# Patient Record
Sex: FEMALE | Race: WHITE
[De-identification: names, ages, dates, MRNs, and addresses within clinical notes are randomized per-mention and may not be internally consistent; named-entity substitution may affect disease eponyms.]

---

## 2017-07-12 ENCOUNTER — HOSPITAL ENCOUNTER (INPATIENT)
Dept: HOSPITAL 10 - OBT | Age: 37
LOS: 2 days | Discharge: HOME | End: 2017-07-14
Attending: OBSTETRICS & GYNECOLOGY | Admitting: OBSTETRICS & GYNECOLOGY
Payer: MEDICAID

## 2017-07-12 VITALS
WEIGHT: 154.98 LBS | HEIGHT: 62 IN | WEIGHT: 154.98 LBS | BODY MASS INDEX: 28.52 KG/M2 | HEIGHT: 62 IN | BODY MASS INDEX: 28.52 KG/M2

## 2017-07-12 VITALS — SYSTOLIC BLOOD PRESSURE: 105 MMHG | DIASTOLIC BLOOD PRESSURE: 57 MMHG | RESPIRATION RATE: 18 BRPM | HEART RATE: 95 BPM

## 2017-07-12 DIAGNOSIS — Z3A.37: ICD-10-CM

## 2017-07-12 DIAGNOSIS — Z23: ICD-10-CM

## 2017-07-12 LAB
ADD SCAN DIFF: NO
APTT BLD: 25.9 SEC (ref 25–35)
BASOPHILS # BLD AUTO: 0 10^3/UL (ref 0–0.1)
BASOPHILS NFR BLD: 0.5 % (ref 0–2)
EOSINOPHIL # BLD: 0 10^3/UL (ref 0–0.5)
EOSINOPHIL NFR BLD: 0.2 % (ref 0–7)
ERYTHROCYTE [DISTWIDTH] IN BLOOD BY AUTOMATED COUNT: 12.4 % (ref 11.5–14.5)
HCT VFR BLD CALC: 28.7 % (ref 37–47)
HGB BLD-MCNC: 9.8 G/DL (ref 12–16)
INR PPP: 0.95
LYMPHOCYTES # BLD AUTO: 1.7 10^3/UL (ref 0.8–2.9)
LYMPHOCYTES NFR BLD AUTO: 20.9 % (ref 15–51)
MCH RBC QN AUTO: 31 PG (ref 29–33)
MCHC RBC AUTO-ENTMCNC: 34.1 G/DL (ref 32–37)
MCV RBC AUTO: 90.8 FL (ref 82–101)
MONOCYTES # BLD: 0.3 10^3/UL (ref 0.3–0.9)
MONOCYTES NFR BLD: 4.1 % (ref 0–11)
NEUTROPHILS # BLD: 5.9 10^3/UL (ref 1.6–7.5)
NEUTROPHILS NFR BLD AUTO: 73.7 % (ref 39–77)
NRBC # BLD MANUAL: 0 10^3/UL (ref 0–0)
NRBC BLD QL: 0 /100WBC (ref 0–0)
PLATELET # BLD: 282 10^3/UL (ref 140–415)
PMV BLD AUTO: 10.7 FL (ref 7.4–10.4)
PROTHROMBIN TIME: 12.7 SEC (ref 12.2–14.2)
PT RATIO: 1
RBC # BLD AUTO: 3.16 10^6/UL (ref 4.2–5.4)
WBC # BLD AUTO: 8.1 10^3/UL (ref 4.8–10.8)

## 2017-07-12 PROCEDURE — 87340 HEPATITIS B SURFACE AG IA: CPT

## 2017-07-12 PROCEDURE — 62319: CPT

## 2017-07-12 PROCEDURE — 85014 HEMATOCRIT: CPT

## 2017-07-12 PROCEDURE — 86900 BLOOD TYPING SEROLOGIC ABO: CPT

## 2017-07-12 PROCEDURE — 90716 VAR VACCINE LIVE SUBQ: CPT

## 2017-07-12 PROCEDURE — 86901 BLOOD TYPING SEROLOGIC RH(D): CPT

## 2017-07-12 PROCEDURE — 90715 TDAP VACCINE 7 YRS/> IM: CPT

## 2017-07-12 PROCEDURE — 85730 THROMBOPLASTIN TIME PARTIAL: CPT

## 2017-07-12 PROCEDURE — 86592 SYPHILIS TEST NON-TREP QUAL: CPT

## 2017-07-12 PROCEDURE — 85610 PROTHROMBIN TIME: CPT

## 2017-07-12 PROCEDURE — 85025 COMPLETE CBC W/AUTO DIFF WBC: CPT

## 2017-07-12 PROCEDURE — G0463 HOSPITAL OUTPT CLINIC VISIT: HCPCS

## 2017-07-12 PROCEDURE — 3E033VJ INTRODUCTION OF OTHER HORMONE INTO PERIPHERAL VEIN, PERCUTANEOUS APPROACH: ICD-10-PCS | Performed by: OBSTETRICS & GYNECOLOGY

## 2017-07-12 PROCEDURE — 85018 HEMOGLOBIN: CPT

## 2017-07-12 RX ADMIN — PYRIDOXINE HYDROCHLORIDE SCH MLS/HR: 100 INJECTION, SOLUTION INTRAMUSCULAR; INTRAVENOUS at 13:59

## 2017-07-12 RX ADMIN — PYRIDOXINE HYDROCHLORIDE SCH MLS/HR: 100 INJECTION, SOLUTION INTRAMUSCULAR; INTRAVENOUS at 17:17

## 2017-07-12 NOTE — HP
Date/Time of Note


Date/Time of Note


DATE: 17 


TIME: 22:38





OB - History


Hx of Present Pregnancy


Free Text/Dictation


36-year-old   with IUP at 37 weeks and 6 days  and prenatal care at [facility] , presented in labor. She was 4/80/-3. GBS was unknown. Admited in labor 

and started on Abx, GBS prophylaxis.


KATHERINE: 2017 


FHT: Cat 1. contractions every 2-3/min


Estimated Due Date:  2017


:  7


Para:  6


Spontaneous :  0


Prenatal Care:  Good Care


Obstetrical Complications:  None





Past Family/Social History


*


Past Medical, Surgical, Family and Obstetric Histories reviewed from prenatal 

chart.


Rubella:  immune


RPR/VDRL:  Negative


GBS Status:  Unknown


HBsAG:  Negative





OB  Admission Exam


Vital Signs


Vital Signs





 Vital Signs








  Date Time  Temp Pulse Resp B/P Pulse Ox O2 Delivery O2 Flow Rate FiO2


 


17 12:16 98.1 95 18 105/57  Room Air  











Physical Exam


HEENT:  WNL


Lungs:  Clear


Abdomen:  WNL


Extremities:  Normal


Reflexes:  Normal


Cervical Dilatation:  4cm


Effacement:  75%


Station:  -3


Membranes:  Intact


Fetal Heart Rate:  130's


Accelerations:  Accelerations Present


Decelerations:  Early Decelerations


Varibility:  Moderate


Contractions on Admission:  < 5 Minutes Apart


Intensity:  Moderate


Last 72 hours Lab Results


 CBC & BMP


17 12:35











OB  Assessment/Plan


Reason for admission:  active labor


Other Assessment:


IUP at 37 weeks and 6 days


Active labor


GBs unknown


Started on GBS prophylaxis


Anticipate 


Desires epidural for the labor pain











SHANNAN GROSS MD 2017 22:44

## 2017-07-12 NOTE — TRIAGE
===================================

OB Triage

===================================

Datetime Report Generated by CPN: 07/12/2017 12:24

   

   

===========================

Datetime: 07/12/2017 12:23

===========================

   

 Assessment Type:  Admission Assessment

   

===================================

Maternal Assessment

===================================

   

 Level of Consciousness:  Fully Conscious

 DTR's/Clonus:  DTRs 2+; No Clonus

 Headache:  Denies

 Blurred Vision:  No

 Respiratory Effort:  Unlabored; Regular Rhythm; Equal Expansion

 Breath Sounds, Left:  Clear and Equal

 Breath Sounds, Right:  Clear and Equal

 Nausea/Vomiting:  Denies

 RUQ Epigastric Pain:  Denies

 Lower Extremities Edema:  None

     Degree:  None

 Upper Extremities Edema:  None

     Degree:  None

 Facial Edema:  None

   

===================================

Fall Risk Assessment

===================================

   

 History of Falling:  (0) No

 Secondary Diagnosis:  (0) No

 Ambulatory Aid:  (0) Bedrest/Nurse Assist

 IV Therapy:  (0) No

 Gait:  (0) Normal/Bedrest/Immobile

 Mental Status:  (0) Oriented to Own Ability

 Fall Score:  0

 Fall Risk Score Definition:  No Risk: No action required

   

===========================

Datetime: 07/12/2017 12:22

===========================

   

 EGA:  37.6

   

===========================

Datetime: 07/12/2017 12:21

===========================

   

 Time of Arrival:  07/12/2017 11:30

 Arrived By:  Ambulatory

 Arrived From:  Home

 Chief Complaint:  UC'S

 Fetal Movement:  Present

 Contractions:  Regular

 Time Contractions Began:  07/11/2017 22:00

 Rupture of Membranes:  Denies

 Vaginal Bleeding:  None

 Vaginal Discharge:  Denies

 Recent Sexual Intercouse:  Denies

 Abdominal Trauma:  Not Applicable

 Patient Complaints:  Contractions; Cramping

 Time Provider Notified:  07/12/2017 12:10

 Provider Notified:  DR GROSS

 Initial Plan:  LOIS MENCHACA

## 2017-07-12 NOTE — LDN
Date/Time of Note


Date/Time of Note


DATE: 7/12/17 


TIME: 22:44





Delivery Summary


07/12/2017


Weeks of Gestation


37 weeks and 6 days


Placenta Delivered:  Spontaneously


Meconium:  none


Episiotomy:  No


Perineal laceration:  0


Laceration repair:  


supraurethral


lacerations that


repaired with 3-0


Vicryl. cathater placed


and was noted to be


intact.


Anesthesia type:  Epidural


Estimated blood loss:  100


Sponge & Needle done & correct:  Yes


All needle counts correct:  Yes


Any foreign bodies felt in the:  No


Problems:  





Infant Delivery Information


Sex


Infant Sex:  female





Apgars


1 Minute:  9


5 Minute:  9





Suctioning


Nose & mouth suctioned at dimple:  Yes


Delee suction performed:  Yes





Umbilical Cord


Umbilical cord with:  3 Vessels


Cord presentations:  nuchal cord


Nuchal cord present X:  1


Cord Blood was obtained:  Yes











SHANNAN GROSS MD Jul 12, 2017 22:47

## 2017-07-13 VITALS — RESPIRATION RATE: 18 BRPM | DIASTOLIC BLOOD PRESSURE: 59 MMHG | SYSTOLIC BLOOD PRESSURE: 105 MMHG | HEART RATE: 66 BPM

## 2017-07-13 VITALS — SYSTOLIC BLOOD PRESSURE: 89 MMHG | DIASTOLIC BLOOD PRESSURE: 54 MMHG | HEART RATE: 68 BPM | RESPIRATION RATE: 18 BRPM

## 2017-07-13 VITALS — RESPIRATION RATE: 17 BRPM | DIASTOLIC BLOOD PRESSURE: 69 MMHG | HEART RATE: 60 BPM | SYSTOLIC BLOOD PRESSURE: 107 MMHG

## 2017-07-13 VITALS — RESPIRATION RATE: 18 BRPM | HEART RATE: 69 BPM | DIASTOLIC BLOOD PRESSURE: 55 MMHG | SYSTOLIC BLOOD PRESSURE: 93 MMHG

## 2017-07-13 VITALS — RESPIRATION RATE: 18 BRPM | SYSTOLIC BLOOD PRESSURE: 89 MMHG | DIASTOLIC BLOOD PRESSURE: 60 MMHG | HEART RATE: 77 BPM

## 2017-07-13 LAB
HCT VFR BLD CALC: 28.4 % (ref 37–47)
HGB BLD-MCNC: 9.4 G/DL (ref 12–16)

## 2017-07-13 RX ADMIN — IBUPROFEN SCH MG: 600 TABLET ORAL at 13:10

## 2017-07-13 RX ADMIN — DOCUSATE SODIUM AND SENNOSIDES SCH TAB: 8.6; 5 TABLET, FILM COATED ORAL at 01:00

## 2017-07-13 RX ADMIN — ACETAMINOPHEN AND CODEINE PHOSPHATE PRN TAB: 30; 300 TABLET ORAL at 18:18

## 2017-07-13 RX ADMIN — ACETAMINOPHEN AND CODEINE PHOSPHATE PRN TAB: 30; 300 TABLET ORAL at 14:08

## 2017-07-13 RX ADMIN — PYRIDOXINE HYDROCHLORIDE SCH MLS/HR: 100 INJECTION, SOLUTION INTRAMUSCULAR; INTRAVENOUS at 00:52

## 2017-07-13 RX ADMIN — IBUPROFEN SCH MG: 600 TABLET ORAL at 01:00

## 2017-07-13 RX ADMIN — IBUPROFEN SCH MG: 600 TABLET ORAL at 18:44

## 2017-07-13 RX ADMIN — IBUPROFEN SCH MG: 600 TABLET ORAL at 23:58

## 2017-07-13 RX ADMIN — ACETAMINOPHEN AND CODEINE PHOSPHATE PRN TAB: 30; 300 TABLET ORAL at 02:19

## 2017-07-13 RX ADMIN — DOCUSATE SODIUM AND SENNOSIDES SCH TAB: 8.6; 5 TABLET, FILM COATED ORAL at 20:48

## 2017-07-13 RX ADMIN — PYRIDOXINE HYDROCHLORIDE SCH MLS/HR: 100 INJECTION, SOLUTION INTRAMUSCULAR; INTRAVENOUS at 08:52

## 2017-07-13 RX ADMIN — ACETAMINOPHEN AND CODEINE PHOSPHATE PRN TAB: 30; 300 TABLET ORAL at 08:23

## 2017-07-13 RX ADMIN — DOCUSATE SODIUM AND SENNOSIDES SCH TAB: 8.6; 5 TABLET, FILM COATED ORAL at 09:15

## 2017-07-13 RX ADMIN — IBUPROFEN SCH MG: 600 TABLET ORAL at 05:31

## 2017-07-13 RX ADMIN — PYRIDOXINE HYDROCHLORIDE SCH MLS/HR: 100 INJECTION, SOLUTION INTRAMUSCULAR; INTRAVENOUS at 16:52

## 2017-07-13 NOTE — PN
Date/Time of Note


Date/Time of Note


DATE: 7/13/17 


TIME: 12:03





OB Subjective


Subjective


Subjective


July 13, 2017


OB Hospital ground


Post partum day 1





Patient is doing well,


Ambulatory 


She is afebrile


Abdomen is soft ,


Fundus is firm


Moderate amount of lochia


Breasts are soft,


Nipples are intact


No calf tenderness.


Perineum is healing well.


Breast feeding the new born.








 Laboratory Tests








Test


  7/12/17


12:35 7/13/17


09:25


 


White Blood Count 8.110^3/ul  


 


Red Blood Count 3.1610^6/ul  


 


Hemoglobin 9.8g/dl  9.4g/dl 


 


Hematocrit 28.7%  28.4% 


 


Mean Corpuscular Volume 90.8fl  


 


Mean Corpuscular Hemoglobin 31.0pg  


 


Mean Corpuscular Hemoglobin


Concent 34.1g/dl 


  


 


 


Red Cell Distribution Width 12.4%  


 


Platelet Count 99812^3/UL  


 


Mean Platelet Volume 10.7fl  


 


Neutrophils % 73.7%  


 


Lymphocytes % 20.9%  


 


Monocytes % 4.1%  


 


Eosinophils % 0.2%  


 


Basophils % 0.5%  


 


Nucleated Red Blood Cells % 0.0/100WBC  


 


Neutrophils # 5.910^3/ul  


 


Lymphocytes # 1.710^3/ul  


 


Monocytes # 0.310^3/ul  


 


Eosinophils # 0.010^3/ul  


 


Basophils # 0.010^3/ul  


 


Nucleated Red Blood Cells # 0.010^3/ul  


 


Prothrombin Time 12.7Sec  


 


Prothrombin Time Ratio 1.0  


 


INR International Normalized


Ratio 0.95 


  


 


 


Activated Partial


Thromboplast Time 25.9Sec 


  


 


 


Rapid Plasma Reagin NONREACTIVE  


 


Hepatitis B Surface Antigen NEGATIVE  








 Current Medications








 Medications


  (Trade)  Dose


 Ordered  Sig/Trupti


 Route


 PRN Reason  Start Time


 Stop Time Status Last Admin


Dose Admin


 


 Lactated Ringer's


  (Lr)  1,000 ml @ 


 125 mls/hr  Q8H


 IV


   7/12/17 12:17


 7/13/17 00:56 DC 7/12/17 17:17


 


 


 Butorphanol


 Tartrate


  (Stadol)  2 mg  Q2H  PRN


 IV


 PAIN  7/12/17 12:30


 7/13/17 00:56 DC  


 


 


 Lidocaine 30 ml  30 ml  ONCE PRN


 INJ


 EPISIOTOMY/TEARING  7/12/17 12:30


 7/13/17 00:56 DC  


 


 


 Oxytocin/Lactated


 Ringer's  500 ml @ 


 125 mls/hr  ONCE -MAY REPEAT X1


 IV


   7/12/17 12:30


 7/13/17 00:56 DC 7/12/17 21:38


 


 


 Oxytocin/Lactated


 Ringer's  500 ml @ 


 125 mls/hr  ONCE


 IV


   7/12/17 12:30


 7/13/17 00:58 DC 7/12/17 21:38


 


 


 Ibuprofen 600 mg  600 mg  ONCE PRN


 PO


 Mild Pain (Pain Score 1-3)  7/12/17 12:30


 7/13/17 00:58 DC 7/12/17 22:07


 


 


 Lactated Ringer's  1,000 ml @ 


 2,000 mls/hr  Q30M PRN


 IV


 PRE-EPIDURAL BOLUS  7/12/17 12:30


 7/13/17 00:58 DC  


 


 


 Oxytocin/Lactated


 Ringer's  500 ml @ 0


 mls/hr  ONCE PRN


 IV


 For Hemorrhage Management  7/12/17 12:30


 7/13/17 00:58 DC  


 


 


 Methylergonovine


 Maleate


  (Methergine)  0.2 mg  ONCE PRN


 IM


 VAGINAL BLEEDING  7/12/17 12:30


 7/13/17 00:58 DC  


 


 


 Carboprost


 Tromethamine


  (Hemabate)  250 mcg  ONCE PRN


 IM


 VAGINAL BLEEDING  7/12/17 12:30


 7/13/17 00:58 DC  


 


 


 Misoprostol 1000


 mcg  1,000 mcg  ONCE PRN


 UT


 VAGINAL BLEEDING  7/12/17 12:30


 7/13/17 00:58 DC  


 


 


 Ampicillin  100 ml @ 


 100 mls/hr  ONCE  ONCE


 IVPB


   7/12/17 15:00


 7/12/17 15:59 DC 7/12/17 15:27


 


 


 Ampicillin  50 ml @ 


 100 mls/hr  Q4H


 IVPB


   7/12/17 19:00


 7/13/17 00:56 DC 7/12/17 18:56


 


 


 Ampicillin  100 ml @ ud  STK-MED ONCE


 .ROUTE


   7/12/17 14:34


 7/12/17 14:35 DC  


 


 


 Fentanyl/


 Ropivacaine  100 ml @ ud  STK-MED ONCE


 .ROUTE


   7/12/17 17:41


 7/12/17 17:42 DC  


 


 


 Naloxone HCl


  (Narcan)  0.2 mg  Q2M  PRN


 IV


 FOR RESP RATE 8 OR LESS  7/12/17 18:30


 7/13/17 00:56 DC  


 


 


 Fentanyl/


 Ropivacaine 100 ml  100 ml  EPIDURAL (PCEA)


 EPI


   7/12/17 18:30


 7/13/17 00:56 DC  


 


 


 Lactated Ringer's


  (Lr)  1,000 ml @ 


 125 mls/hr  Q8H


 IV*


   7/13/17 00:52


     


 


 


 Methylergonovine


 Maleate


  (Methergine)  0.2 mg  Q6H  PRN


 PO


 VAGINAL BLEEDING  7/13/17 01:00


     


 


 


 Morphine Sulfate


  (morphine)  1 mg  Q3H  PRN


 IV


 PAIN LEVEL 6-10  7/13/17 01:00


     


 


 


 Ibuprofen


  (Motrin)  600 mg  Q6


 PO


   7/13/17 01:00


    7/13/17 05:31


 


 


 Acetaminophen/


 Codeine Phosphate


  (Tylenol No.3)  1 tab  Q4H  PRN


 PO


 PAIN LEVEL 1-5  7/13/17 01:00


    7/13/17 08:23


 


 


 Ondansetron HCl


  (Zofran Inj)  4 mg  Q6H  PRN


 IV


 NAUSEA AND/OR VOMITING  7/13/17 01:00


     


 


 


 Diphenhydramine


 HCl


  (Benadryl)  25 mg  Q6H  PRN


 PO


 PRURITUS  7/13/17 01:00


     


 


 


 Zolpidem Tartrate


  (Ambien)  5 mg  QHS  PRN


 PO


 INSOMNIA  7/13/17 01:00


     


 


 


 Senna/Docusate


 Sodium


  (Senokot-S)  1 tab  BID


 PO


   7/13/17 01:00


     


 


 


 Witch Hazel/


 Glycerin


  (Tucks Pads)  1 pad  BEDSIDE MEDICATION  PRN


 UT


 HEMORRHOID/EPISIOTMY PAIN  7/13/17 01:00


    7/13/17 02:19


 


 


 Measles/Mumps/


 Rubella Vaccine


 Live


  (Mmr Ii Vaccine)  0.5 ml  ONCE ONCE


 SC*


   7/14/17 09:00


 7/14/17 09:01   


 


 


 Diphtheria/


 Tetanus/Acell


 Pertussis


  (Adacel)  0.5 ml  ONCE ONCE


 IM*


   7/14/17 09:00


 7/14/17 09:01   


 


 


 Varicella Virus


 Vaccine Live


  (Varivax Vaccine


 With Diluent)  1,350 unit  ONCE ONCE


 SC*


   7/14/17 09:00


 7/14/17 09:01   


 


 


 Acetaminophen 650


 mg  650 mg  Q4H  PRN


 PO


 ELEVATED TEMPERATURE  7/13/17 01:00


     


 


 


 Oxytocin/Lactated


 Ringer's  500 ml @ 0


 mls/hr  ONCE PRN


 IV


 For Hemorrhage Management  7/13/17 01:00


     


 


 


 Methylergonovine


 Maleate


  (Methergine)  0.2 mg  ONCE PRN


 IM


 VAGINAL BLEEDING  7/13/17 01:00


     


 


 


 Carboprost


 Tromethamine


  (Hemabate)  250 mcg  ONCE PRN


 IM


 VAGINAL BLEEDING  7/13/17 01:00


     


 


 


 Misoprostol


  (Cytotec)  1,000 mcg  ONCE PRN


 UT


 VAGINAL BLEEDING  7/13/17 01:00


     


 

















DOMINGA PALM MD Jul 13, 2017 12:04

## 2017-07-14 VITALS — RESPIRATION RATE: 18 BRPM | SYSTOLIC BLOOD PRESSURE: 100 MMHG | DIASTOLIC BLOOD PRESSURE: 60 MMHG | HEART RATE: 69 BPM

## 2017-07-14 VITALS — DIASTOLIC BLOOD PRESSURE: 53 MMHG | RESPIRATION RATE: 18 BRPM | HEART RATE: 62 BPM | SYSTOLIC BLOOD PRESSURE: 95 MMHG

## 2017-07-14 PROCEDURE — 3E00X4Z INTRODUCTION OF SERUM, TOXOID AND VACCINE INTO SKIN AND MUCOUS MEMBRANES, EXTERNAL APPROACH: ICD-10-PCS | Performed by: OBSTETRICS & GYNECOLOGY

## 2017-07-14 RX ADMIN — ACETAMINOPHEN AND CODEINE PHOSPHATE PRN TAB: 30; 300 TABLET ORAL at 07:25

## 2017-07-14 RX ADMIN — ACETAMINOPHEN AND CODEINE PHOSPHATE PRN TAB: 30; 300 TABLET ORAL at 02:20

## 2017-07-14 RX ADMIN — PYRIDOXINE HYDROCHLORIDE SCH MLS/HR: 100 INJECTION, SOLUTION INTRAMUSCULAR; INTRAVENOUS at 00:52

## 2017-07-14 RX ADMIN — ACETAMINOPHEN AND CODEINE PHOSPHATE PRN TAB: 30; 300 TABLET ORAL at 11:56

## 2017-07-14 RX ADMIN — IBUPROFEN SCH MG: 600 TABLET ORAL at 05:33

## 2017-07-14 RX ADMIN — PYRIDOXINE HYDROCHLORIDE SCH MLS/HR: 100 INJECTION, SOLUTION INTRAMUSCULAR; INTRAVENOUS at 06:25

## 2017-07-14 RX ADMIN — DOCUSATE SODIUM AND SENNOSIDES SCH TAB: 8.6; 5 TABLET, FILM COATED ORAL at 09:26

## 2017-07-14 RX ADMIN — IBUPROFEN SCH MG: 600 TABLET ORAL at 11:56

## 2017-07-14 NOTE — PD.PPDC
OB/GYN Discharge Instruction


Diagnosis


Final Diagnosis:  Term pregnancy.NVD.





Condition


Patient Condition:  Good





Diet


Diet:  Resume Regular Diet





Activity/Restrictions








Activity:   Normal Activity





 May Shower














Restrictions:   No Exercising





 No Lifting





 No Sexual Activity





 Nothing in the Vagina





 No [place]





 No Tampons, douche











Follow-up


Follow-up with Physician:  2, Week/Weeks





Return to clinic for








GYN Instructions:   Fever greater than 101





 Worsening abdominal pain





 Excessive Vaginal Bleeding





 Unable to tolerate diet














OB Instructions:   Breast Tenderness














Surgical Instructions:   Incisional Drainage





 Incisional Redness

















HUSAM WU MD Jul 14, 2017 09:25

## 2017-07-14 NOTE — DS
Date/Time of Note


Date/Time of Note


DATE: 17 


TIME: 09:27





Obstetrical Discharge Record


Final Diagnosis


Final Diagnosis:  Term delivered





Vaginal Delivery


Obstetrical Delivery:  Spontaneous





Complications


Induction:  No


 Rupture of Membranes:  No





Condition on Discharge


Physical Assessment


Last Vitals:


Afebrile.Normal BP


Voiding:  Yes


Bowel Movement:  Yes


Breast:  Soft, non-tender


Fundus:  Firm


Calf Tenderness:  No


Patient Condition:  Good











HUSAM WU MD 2017 09:29

## 2019-01-11 ENCOUNTER — HOSPITAL ENCOUNTER (INPATIENT)
Dept: HOSPITAL 91 - L-D | Age: 39
LOS: 2 days | Discharge: HOME | End: 2019-01-13
Payer: MEDICAID

## 2019-01-11 ENCOUNTER — HOSPITAL ENCOUNTER (INPATIENT)
Dept: HOSPITAL 10 - L-D | Age: 39
LOS: 2 days | Discharge: HOME | End: 2019-01-13
Attending: OBSTETRICS & GYNECOLOGY | Admitting: OBSTETRICS & GYNECOLOGY
Payer: MEDICAID

## 2019-01-11 VITALS — RESPIRATION RATE: 18 BRPM | HEART RATE: 83 BPM | DIASTOLIC BLOOD PRESSURE: 82 MMHG | SYSTOLIC BLOOD PRESSURE: 131 MMHG

## 2019-01-11 VITALS — DIASTOLIC BLOOD PRESSURE: 65 MMHG | SYSTOLIC BLOOD PRESSURE: 112 MMHG | HEART RATE: 67 BPM | RESPIRATION RATE: 18 BRPM

## 2019-01-11 VITALS
HEIGHT: 60 IN | WEIGHT: 178.35 LBS | HEIGHT: 60 IN | BODY MASS INDEX: 35.02 KG/M2 | WEIGHT: 178.35 LBS | BODY MASS INDEX: 35.02 KG/M2

## 2019-01-11 DIAGNOSIS — Z3A.38: ICD-10-CM

## 2019-01-11 LAB
ADD MAN DIFF?: NO
BASOPHIL #: 0 10^3/UL (ref 0–0.1)
BASOPHILS %: 0.3 % (ref 0–2)
EOSINOPHILS #: 0 10^3/UL (ref 0–0.5)
EOSINOPHILS %: 0.2 % (ref 0–7)
HEMATOCRIT: 33.3 % (ref 37–47)
HEMOGLOBIN: 11.2 G/DL (ref 12–16)
HEPATITIS B SURFACE ANTIGEN: NEGATIVE
IMMATURE GRANS #M: 0.07 10^3/UL (ref 0–0.03)
IMMATURE GRANS % (M): 0.7 % (ref 0–0.43)
INR: 0.86
LYMPHOCYTES #: 2.2 10^3/UL (ref 0.8–2.9)
LYMPHOCYTES %: 21.6 % (ref 15–51)
MEAN CORPUSCULAR HEMOGLOBIN: 32.2 PG (ref 29–33)
MEAN CORPUSCULAR HGB CONC: 33.6 G/DL (ref 32–37)
MEAN CORPUSCULAR VOLUME: 95.7 FL (ref 82–101)
MEAN PLATELET VOLUME: 10.4 FL (ref 7.4–10.4)
MONOCYTE #: 0.5 10^3/UL (ref 0.3–0.9)
MONOCYTES %: 4.7 % (ref 0–11)
NEUTROPHIL #: 7.5 10^3/UL (ref 1.6–7.5)
NEUTROPHILS %: 72.5 % (ref 39–77)
NUCLEATED RED BLOOD CELLS #: 0 10^3/UL (ref 0–0)
NUCLEATED RED BLOOD CELLS%: 0 /100WBC (ref 0–0)
PARTIAL THROMBOPLASTIN TIME: 23.9 SEC (ref 23–35)
PLATELET COUNT: 312 10^3/UL (ref 140–415)
PROTIME: 11.8 SEC (ref 11.9–14.9)
PT RATIO: 0.9
RED BLOOD COUNT: 3.48 10^6/UL (ref 4.2–5.4)
RED CELL DISTRIBUTION WIDTH: 12.3 % (ref 11.5–14.5)
WHITE BLOOD COUNT: 10.4 10^3/UL (ref 4.8–10.8)

## 2019-01-11 PROCEDURE — 86901 BLOOD TYPING SEROLOGIC RH(D): CPT

## 2019-01-11 PROCEDURE — 86850 RBC ANTIBODY SCREEN: CPT

## 2019-01-11 PROCEDURE — 85025 COMPLETE CBC W/AUTO DIFF WBC: CPT

## 2019-01-11 PROCEDURE — 86900 BLOOD TYPING SEROLOGIC ABO: CPT

## 2019-01-11 PROCEDURE — 85730 THROMBOPLASTIN TIME PARTIAL: CPT

## 2019-01-11 PROCEDURE — 85610 PROTHROMBIN TIME: CPT

## 2019-01-11 PROCEDURE — 87340 HEPATITIS B SURFACE AG IA: CPT

## 2019-01-11 PROCEDURE — 86592 SYPHILIS TEST NON-TREP QUAL: CPT

## 2019-01-11 RX ADMIN — PYRIDOXINE HYDROCHLORIDE 1 MLS/HR: 100 INJECTION, SOLUTION INTRAMUSCULAR; INTRAVENOUS at 19:45

## 2019-01-11 RX ADMIN — METHYLERGONOVINE MALEATE 1 MG: 0.2 INJECTION, SOLUTION INTRAMUSCULAR; INTRAVENOUS at 20:25

## 2019-01-11 RX ADMIN — IBUPROFEN 1 MG: 600 TABLET ORAL at 20:25

## 2019-01-11 RX ADMIN — AMPICILLIN 1 MLS/HR: 2 INJECTION, POWDER, FOR SOLUTION INTRAVENOUS at 20:12

## 2019-01-11 RX ADMIN — OXYCODONE HYDROCHLORIDE AND ASPIRIN PRN TAB: 4.8355; 325 TABLET ORAL at 23:28

## 2019-01-11 RX ADMIN — BUTORPHANOL TARTRATE 1 MG: 2 INJECTION, SOLUTION INTRAMUSCULAR; INTRAVENOUS at 20:39

## 2019-01-11 RX ADMIN — Medication 1 MLS/HR: at 20:21

## 2019-01-11 RX ADMIN — PYRIDOXINE HYDROCHLORIDE SCH MLS/HR: 100 INJECTION, SOLUTION INTRAMUSCULAR; INTRAVENOUS at 21:14

## 2019-01-11 RX ADMIN — Medication 1 MLS/HR: at 19:38

## 2019-01-11 RX ADMIN — OXYTOCIN 1 MLS/HR: 10 INJECTION, SOLUTION INTRAMUSCULAR; INTRAVENOUS at 21:14

## 2019-01-11 RX ADMIN — OXYCODONE HYDROCHLORIDE AND ASPIRIN 1 TAB: 4.8355; 325 TABLET ORAL at 23:28

## 2019-01-11 RX ADMIN — PYRIDOXINE HYDROCHLORIDE 1 MLS/HR: 100 INJECTION, SOLUTION INTRAMUSCULAR; INTRAVENOUS at 21:14

## 2019-01-11 NOTE — LDN
Date/Time of Note


Date/Time of Note


DATE: 19 


TIME: 21:05





Delivery Summary


38-year-old G 12  with single intrauterine pregnancy at 38 weeks and 1 day 


delivered a viable male  over intact perineum.  Nose and mouth suctioned.


 There was tight nuchal cord x1 which caught and clamped.  Rest of body 


delivered.  Baby given to the nurse.  Placenta delivered spontaneously and 


intact with three-vessel cord.  There was no laceration.  Patient tolerated 


procedure well


Time of delivery 19; 39 weight 7 pounds 13 ounces - 3555 g


Amniotic fluid clear


Apgar 9 at 1 minutes and 9 at 5 minutes


 mL


Weeks of Gestation


38 weeks and 1 day


Placenta Delivered:  Spontaneously


Meconium:  none


Anesthesia type:  None


Estimated blood loss:  250


Sponge & Needle done & correct:  Yes


All needle counts correct:  Yes


Any foreign bodies felt in the:  No





Infant Delivery Information


Sex


Infant Sex:  male





Apgars


1 Minute:  9


5 Minute:  9


10 Minute:  10





Suctioning


Nose & mouth suctioned at dimple:  Yes





Umbilical Cord


Umbilical cord with:  3 Vessels


Cord presentations:  nuchal cord


Nuchal cord present X:  1


Cord Blood was obtained:  Yes





Mother & Baby Disposition


Disposition


Mom & Baby to Maternity; Good:  Yes











MAGNUS COLLINS                  2019 21:07

## 2019-01-12 VITALS — DIASTOLIC BLOOD PRESSURE: 58 MMHG | HEART RATE: 68 BPM | SYSTOLIC BLOOD PRESSURE: 106 MMHG | RESPIRATION RATE: 18 BRPM

## 2019-01-12 VITALS — DIASTOLIC BLOOD PRESSURE: 54 MMHG | SYSTOLIC BLOOD PRESSURE: 95 MMHG | RESPIRATION RATE: 18 BRPM | HEART RATE: 70 BPM

## 2019-01-12 VITALS — SYSTOLIC BLOOD PRESSURE: 94 MMHG | RESPIRATION RATE: 18 BRPM | DIASTOLIC BLOOD PRESSURE: 51 MMHG | HEART RATE: 71 BPM

## 2019-01-12 VITALS — SYSTOLIC BLOOD PRESSURE: 97 MMHG | HEART RATE: 74 BPM | DIASTOLIC BLOOD PRESSURE: 62 MMHG | RESPIRATION RATE: 18 BRPM

## 2019-01-12 LAB
ADD MAN DIFF?: NO
BASOPHIL #: 0 10^3/UL (ref 0–0.1)
BASOPHILS %: 0.4 % (ref 0–2)
EOSINOPHILS #: 0 10^3/UL (ref 0–0.5)
EOSINOPHILS %: 0.2 % (ref 0–7)
HEMATOCRIT: 32.2 % (ref 37–47)
HEMOGLOBIN: 10.7 G/DL (ref 12–16)
IMMATURE GRANS #M: 0.07 10^3/UL (ref 0–0.03)
IMMATURE GRANS % (M): 0.6 % (ref 0–0.43)
LYMPHOCYTES #: 2.2 10^3/UL (ref 0.8–2.9)
LYMPHOCYTES %: 19.7 % (ref 15–51)
MEAN CORPUSCULAR HEMOGLOBIN: 31.6 PG (ref 29–33)
MEAN CORPUSCULAR HGB CONC: 33.2 G/DL (ref 32–37)
MEAN CORPUSCULAR VOLUME: 95 FL (ref 82–101)
MEAN PLATELET VOLUME: 10.5 FL (ref 7.4–10.4)
MONOCYTE #: 0.6 10^3/UL (ref 0.3–0.9)
MONOCYTES %: 5.5 % (ref 0–11)
NEUTROPHIL #: 8.1 10^3/UL (ref 1.6–7.5)
NEUTROPHILS %: 73.6 % (ref 39–77)
NUCLEATED RED BLOOD CELLS #: 0 10^3/UL (ref 0–0)
NUCLEATED RED BLOOD CELLS%: 0 /100WBC (ref 0–0)
PLATELET COUNT: 289 10^3/UL (ref 140–415)
RAPID PLASMA REAGIN: NONREACTIVE
RED BLOOD COUNT: 3.39 10^6/UL (ref 4.2–5.4)
RED CELL DISTRIBUTION WIDTH: 12 % (ref 11.5–14.5)
WHITE BLOOD COUNT: 11 10^3/UL (ref 4.8–10.8)

## 2019-01-12 RX ADMIN — PYRIDOXINE HYDROCHLORIDE 1 MLS/HR: 100 INJECTION, SOLUTION INTRAMUSCULAR; INTRAVENOUS at 13:14

## 2019-01-12 RX ADMIN — IBUPROFEN 1 MG: 600 TABLET ORAL at 00:13

## 2019-01-12 RX ADMIN — PYRIDOXINE HYDROCHLORIDE 1 MLS/HR: 100 INJECTION, SOLUTION INTRAMUSCULAR; INTRAVENOUS at 00:14

## 2019-01-12 RX ADMIN — OXYCODONE HYDROCHLORIDE AND ASPIRIN 1 TAB: 4.8355; 325 TABLET ORAL at 21:14

## 2019-01-12 RX ADMIN — PYRIDOXINE HYDROCHLORIDE SCH MLS/HR: 100 INJECTION, SOLUTION INTRAMUSCULAR; INTRAVENOUS at 13:14

## 2019-01-12 RX ADMIN — OXYCODONE HYDROCHLORIDE AND ASPIRIN PRN TAB: 4.8355; 325 TABLET ORAL at 14:15

## 2019-01-12 RX ADMIN — OXYCODONE HYDROCHLORIDE AND ASPIRIN PRN TAB: 4.8355; 325 TABLET ORAL at 08:43

## 2019-01-12 RX ADMIN — OXYCODONE HYDROCHLORIDE AND ASPIRIN PRN TAB: 4.8355; 325 TABLET ORAL at 21:14

## 2019-01-12 RX ADMIN — IBUPROFEN 1 MG: 600 TABLET ORAL at 23:37

## 2019-01-12 RX ADMIN — IBUPROFEN 1 MG: 600 TABLET ORAL at 11:54

## 2019-01-12 RX ADMIN — IBUPROFEN SCH MG: 600 TABLET ORAL at 00:13

## 2019-01-12 RX ADMIN — OXYCODONE HYDROCHLORIDE AND ASPIRIN 1 TAB: 4.8355; 325 TABLET ORAL at 14:15

## 2019-01-12 RX ADMIN — IBUPROFEN SCH MG: 600 TABLET ORAL at 23:37

## 2019-01-12 RX ADMIN — IBUPROFEN 1 MG: 600 TABLET ORAL at 05:42

## 2019-01-12 RX ADMIN — IBUPROFEN SCH MG: 600 TABLET ORAL at 17:09

## 2019-01-12 RX ADMIN — OXYCODONE HYDROCHLORIDE AND ASPIRIN PRN TAB: 4.8355; 325 TABLET ORAL at 03:28

## 2019-01-12 RX ADMIN — IBUPROFEN 1 MG: 600 TABLET ORAL at 17:09

## 2019-01-12 RX ADMIN — PYRIDOXINE HYDROCHLORIDE SCH MLS/HR: 100 INJECTION, SOLUTION INTRAMUSCULAR; INTRAVENOUS at 00:14

## 2019-01-12 RX ADMIN — OXYCODONE HYDROCHLORIDE AND ASPIRIN 1 TAB: 4.8355; 325 TABLET ORAL at 03:28

## 2019-01-12 RX ADMIN — IBUPROFEN SCH MG: 600 TABLET ORAL at 05:42

## 2019-01-12 RX ADMIN — IBUPROFEN SCH MG: 600 TABLET ORAL at 11:54

## 2019-01-12 RX ADMIN — OXYCODONE HYDROCHLORIDE AND ASPIRIN 1 TAB: 4.8355; 325 TABLET ORAL at 08:43

## 2019-01-12 NOTE — DS
Date/Time of Note


Date/Time of Note


DATE: 19 


TIME: 12:27





Obstetrical Discharge Record


Final Diagnosis


Final Diagnosis:  Term delivered


Other Final Diagnosis





38-year-old  s/p normal vaginal delivery at 38 weeks and 1 day. PPD#1.  


Her postpartum course was unremarkable.  She is ambulating and tolerating 


regular diet.  She is voiding without difficulty.  Pain is well controlled on 


current medication.


-  AF, VSS


-  Baby is doing well, at bed side. She is bonding well


-  Contraception methods with R/B/A/FR discussed


-  Continue Postpartum care


-  Discharge home tomorrow


-  Rx and instruction given


-  Follow up in 2 and 6 weeks at clinic





Condition on Discharge


Physical Assessment


Last Vitals:





Vital Signs


  Date      Temp  Pulse  Resp  B/P (MAP)   Pulse Ox  O2          O2 Flow    FiO2


Time                                                 Delivery    Rate


   19  98.7     70    18  95/54 (68)            Room Air


     08:43


   19                                       99


     19:23





Voiding:  Yes


Bowel Movement:  Yes


Breast:  Soft, non-tender


Fundus:  Firm


Calf Tenderness:  No


Patient Condition:  Stable











MAGNUS COLLINS                  2019 12:28

## 2019-01-12 NOTE — PN
Date/Time of Note


Date/Time of Note


DATE: 19 


TIME: 12:25





OB Subjective


Subjective


Subjective


PPD# 1





Patient is doing well. She denies nausea, vomiting, shortness of breath, chest 


pain, headache. She has been ambulating without difficulty, tolerating regular 


diet. Pain is well controlled on current medications





OB Objective


Objective


Objective





                          VS - Last 72 Hours, by Label


  Date      Temp  Pulse  Resp  B/P (MAP)   Pulse Ox  O2          O2 Flow    FiO2


Time                                                 Delivery    Rate


   19  98.7     70    18  95/54 (68)            Room Air


     08:43


   19  98.3     68    18      106/58            Room Air


     03:28                           (74)


   19  98.1     67    18      112/65            Room Air


     22:00                           (81)


   19  98.0     83    18      131/82        99


     19:23                           (98)





General: AAO X 3, comfortable, NAD, appropriate mood and affect.


ABD: +BS. Soft, non-tender. Uterus 2 cm below umbilicus 


Flank: No CVA tenderness (B/L)


LE: Mild edema. No clubbing, cyanosis, thigh or calf tenderness (B/L). Homans 


'sign is negative





Laboratory Tests


Test
                       19
19:16          19
06:12   19
07:12


White Blood Count           10.4 10^3/ul                           11.0 10^3/ul


Red Blood Count             3.48 10^6/ul                           3.39 10^6/ul


Hemoglobin                     11.2 g/dl                              10.7 g/dl


Hematocrit                        33.3 %                                 32.2 %


Mean Corpuscular Volume          95.7 fl                                95.0 fl


Mean Corpuscular                 32.2 pg                                31.6 pg


Hemoglobin


Mean Corpuscular              33.6 g/dl 
  
                         33.2 g/dl 



Hemoglobin
Concent


Red Cell Distribution             12.3 %                                 12.0 %


Width


Platelet Count               312 10^3/UL                            289 10^3/UL


Mean Platelet Volume             10.4 fl                                10.5 fl


Immature Granulocytes %          0.700 %                                0.600 %


Neutrophils %                     72.5 %                                 73.6 %


Lymphocytes %                     21.6 %                                 19.7 %


Monocytes %                        4.7 %                                  5.5 %


Eosinophils %                      0.2 %                                  0.2 %


Basophils %                        0.3 %                                  0.4 %


Nucleated Red Blood Cells    0.0 /100WBC                            0.0 /100WBC


%


Immature Granulocytes #    0.070 10^3/ul                          0.070 10^3/ul


Neutrophils #                7.5 10^3/ul                            8.1 10^3/ul


Lymphocytes #                2.2 10^3/ul                            2.2 10^3/ul


Monocytes #                  0.5 10^3/ul                            0.6 10^3/ul


Eosinophils #                0.0 10^3/ul                            0.0 10^3/ul


Basophils #                  0.0 10^3/ul                            0.0 10^3/ul


Nucleated Red Blood Cells    0.0 10^3/ul                            0.0 10^3/ul


#


Prothrombin Time                11.8 Sec


Prothrombin Time Ratio               0.9


INR International                  0.86 
  
                      



Normalized
Ratio


Activated                      23.9 Sec 
  
                      



Partial
Thromboplast Time


Hepatitis B Surface        NEGATIVE


Antigen


Lab Scanned Report
        
               REFERENCE LAB
[id number]  









OB  Assessment/Plan


Other plan:





38-year-old  s/p normal vaginal delivery at 38 weeks and 1 day. PPD#1


-  AF, VSS


-  Baby is doing well, at bed side. She is bonding well


-  Contraception methods with R/B/A/FR discussed


-  Continue Postpartum care


-  Discharge home tomorrow


-  Rx and instruction given


-  Follow up in 2 and 6 weeks at clinic











MAGNUS COLLINS                  2019 12:27 [id number] [phone]

## 2019-01-13 VITALS — DIASTOLIC BLOOD PRESSURE: 59 MMHG | RESPIRATION RATE: 16 BRPM | SYSTOLIC BLOOD PRESSURE: 85 MMHG | HEART RATE: 77 BPM

## 2019-01-13 VITALS — HEART RATE: 83 BPM | RESPIRATION RATE: 20 BRPM | SYSTOLIC BLOOD PRESSURE: 97 MMHG | DIASTOLIC BLOOD PRESSURE: 54 MMHG

## 2019-01-13 RX ADMIN — OXYCODONE HYDROCHLORIDE AND ASPIRIN PRN TAB: 4.8355; 325 TABLET ORAL at 01:23

## 2019-01-13 RX ADMIN — MEASLES, MUMPS, AND RUBELLA VIRUS VACCINE LIVE 1 ML: 1000; 12500; 1000 INJECTION, POWDER, LYOPHILIZED, FOR SUSPENSION SUBCUTANEOUS at 09:32

## 2019-01-13 RX ADMIN — IBUPROFEN 1 MG: 600 TABLET ORAL at 12:01

## 2019-01-13 RX ADMIN — CLOSTRIDIUM TETANI TOXOID ANTIGEN (FORMALDEHYDE INACTIVATED), CORYNEBACTERIUM DIPHTHERIAE TOXOID ANTIGEN (FORMALDEHYDE INACTIVATED), BORDETELLA PERTUSSIS TOXOID ANTIGEN (GLUTARALDEHYDE INACTIVATED), BORDETELLA PERTUSSIS FILAMENTOUS HEMAGGLUTININ ANTIGEN (FORMALDEHYDE INACTIVATED), BORDETELLA PERTUSSIS PERTACTIN ANTIGEN, AND BORDETELLA PERTUSSIS FIMBRIAE 2/3 ANTIGEN 1 ML: 5; 2; 2.5; 5; 3; 5 INJECTION, SUSPENSION INTRAMUSCULAR at 09:32

## 2019-01-13 RX ADMIN — IBUPROFEN 1 MG: 600 TABLET ORAL at 05:39

## 2019-01-13 RX ADMIN — IBUPROFEN SCH MG: 600 TABLET ORAL at 12:01

## 2019-01-13 RX ADMIN — IBUPROFEN SCH MG: 600 TABLET ORAL at 05:39

## 2019-01-13 RX ADMIN — OXYCODONE HYDROCHLORIDE AND ASPIRIN 1 TAB: 4.8355; 325 TABLET ORAL at 01:23
